# Patient Record
Sex: FEMALE | Race: WHITE | NOT HISPANIC OR LATINO | Employment: FULL TIME | URBAN - METROPOLITAN AREA
[De-identification: names, ages, dates, MRNs, and addresses within clinical notes are randomized per-mention and may not be internally consistent; named-entity substitution may affect disease eponyms.]

---

## 2017-01-01 DIAGNOSIS — R01.1 MURMUR, CARDIAC: ICD-10-CM

## 2017-01-01 DIAGNOSIS — I48.0 PAROXYSMAL ATRIAL FIBRILLATION (HCC): Primary | ICD-10-CM

## 2017-01-01 DIAGNOSIS — I27.20 PULMONARY HYPERTENSION (HCC): ICD-10-CM

## 2017-01-11 RX ORDER — ATORVASTATIN CALCIUM 20 MG/1
TABLET, FILM COATED ORAL
Qty: 30 TABLET | Refills: 1 | Status: SHIPPED | OUTPATIENT
Start: 2017-01-11 | End: 2017-03-09 | Stop reason: SDUPTHER

## 2017-02-03 RX ORDER — FUROSEMIDE 40 MG/1
TABLET ORAL
Qty: 30 TABLET | Refills: 0 | Status: SHIPPED | OUTPATIENT
Start: 2017-02-03 | End: 2017-03-10 | Stop reason: SDUPTHER

## 2017-02-28 RX ORDER — CARVEDILOL 6.25 MG/1
TABLET ORAL
Qty: 60 TABLET | Refills: 0 | Status: SHIPPED | OUTPATIENT
Start: 2017-02-28 | End: 2017-04-19 | Stop reason: SDUPTHER

## 2017-02-28 RX ORDER — RAMIPRIL 2.5 MG/1
CAPSULE ORAL
Qty: 30 CAPSULE | Refills: 0 | Status: SHIPPED | OUTPATIENT
Start: 2017-02-28 | End: 2017-04-18 | Stop reason: SDUPTHER

## 2017-03-01 RX ORDER — FUROSEMIDE 40 MG/1
TABLET ORAL
Qty: 30 TABLET | Refills: 0 | OUTPATIENT
Start: 2017-03-01

## 2017-03-06 ENCOUNTER — APPOINTMENT (OUTPATIENT)
Dept: CARDIOLOGY | Facility: HOSPITAL | Age: 57
End: 2017-03-06
Attending: INTERNAL MEDICINE

## 2017-03-06 ENCOUNTER — OFFICE VISIT (OUTPATIENT)
Dept: CARDIOLOGY | Facility: CLINIC | Age: 57
End: 2017-03-06

## 2017-03-06 VITALS
WEIGHT: 139 LBS | BODY MASS INDEX: 23.16 KG/M2 | SYSTOLIC BLOOD PRESSURE: 106 MMHG | HEIGHT: 65 IN | DIASTOLIC BLOOD PRESSURE: 71 MMHG | HEART RATE: 102 BPM

## 2017-03-06 DIAGNOSIS — I48.91 ATRIAL FIBRILLATION AND FLUTTER (HCC): Primary | ICD-10-CM

## 2017-03-06 DIAGNOSIS — J43.9 PULMONARY EMPHYSEMA, UNSPECIFIED EMPHYSEMA TYPE (HCC): ICD-10-CM

## 2017-03-06 DIAGNOSIS — F41.0 PANIC ATTACK: ICD-10-CM

## 2017-03-06 DIAGNOSIS — I48.3 TYPICAL ATRIAL FLUTTER (HCC): ICD-10-CM

## 2017-03-06 DIAGNOSIS — J30.9 ATOPIC RHINITIS: ICD-10-CM

## 2017-03-06 DIAGNOSIS — I27.20 PULMONARY HYPERTENSION (HCC): ICD-10-CM

## 2017-03-06 DIAGNOSIS — I27.81 CHRONIC COR PULMONALE (HCC): ICD-10-CM

## 2017-03-06 DIAGNOSIS — I48.0 PAROXYSMAL ATRIAL FIBRILLATION (HCC): ICD-10-CM

## 2017-03-06 DIAGNOSIS — I48.92 ATRIAL FIBRILLATION AND FLUTTER (HCC): Primary | ICD-10-CM

## 2017-03-06 DIAGNOSIS — Z98.890 HISTORY OF HEART VALVE REPAIR: ICD-10-CM

## 2017-03-06 PROCEDURE — 93000 ELECTROCARDIOGRAM COMPLETE: CPT | Performed by: INTERNAL MEDICINE

## 2017-03-06 PROCEDURE — 99214 OFFICE O/P EST MOD 30 MIN: CPT | Performed by: INTERNAL MEDICINE

## 2017-03-06 NOTE — PROGRESS NOTES
[facility]  Cardiology Office Visit Note        Subjective:     Encounter Date:2017      Patient ID: Griselda Dunn   Age: 56 y.o.  Sex: female  :  1960  MRN: [id number]             Date of Office Visit: 2017  Encounter Provider: Lisa Trejo MD  Place of Service: [facility]     .    Chief Complaint:  History of Present Illness    The following portions of the patient's history were reviewed and updated as appropriate: allergies, current medications, past family history, past medical history, past social history, past surgical history and problem list.    Review of Systems   Constitution: Negative for chills, fever and weight gain.   HENT: Negative for congestion, headaches, hearing loss and sore throat.    Eyes: Positive for double vision. Negative for blurred vision.   Cardiovascular: Negative for chest pain, claudication, cyanosis, dyspnea on exertion, irregular heartbeat, leg swelling, near-syncope, orthopnea, palpitations, paroxysmal nocturnal dyspnea and syncope.   Respiratory: Negative for cough, shortness of breath, snoring and wheezing.    Endocrine: Negative for cold intolerance.   Hematologic/Lymphatic: Negative for adenopathy. Does not bruise/bleed easily.   Skin: Negative for rash.   Musculoskeletal: Negative for back pain.   Gastrointestinal: Negative for abdominal pain, change in bowel habit, constipation, diarrhea, nausea and vomiting.   Genitourinary: Negative for dysuria, frequency, hematuria and hesitancy.   Neurological: Negative for disturbances in coordination, excessive daytime sleepiness, dizziness, focal weakness, light-headedness, loss of balance and numbness.   Psychiatric/Behavioral: Negative for depression and memory loss. The patient is not nervous/anxious.    Allergic/Immunologic: Negative for hives.         ECG 12 Lead  Date/Time: 3/6/2017 2:24 PM  Performed by: LISA TREJO JR  Authorized by: MAYA  LISA MUNIZ   Comparison: compared with previous ECG   Rhythm: sinus rhythm  BPM: 95  Conduction: right bundle branch block and LPFB                       HPI     The patient is a 56-year-old white female with history of paroxysmal atrial fibrillation status post atrial flutter ablation by Dr. Jennings 2013, history of cardiac catheterization in 2011 showing normal coronary arteries, history of mitral and tricuspid valve repair, presents for cardiac follow-up.  I last saw her in the office in 2015.  Since then, she broke her sacrum in 2  places in 2016 and has been sitting on a doughnut until a week ago.  She has not been exercising.    Cardiac review systems: No chest pain.  No PND, orthopnea or pedal edema.  No palpitations dizziness or syncope.    Gen. review systems: She's had chest congestion for the past week.    Cardiac risk factors: No family history of early CAD.  No smoking since 2011.  No diabetes.  Positive for hyperlipidemia and hypertension.            Past Medical History   Diagnosis Date   • Acute bronchitis    • Acute cholecystitis      post cholecystectomy   • Acute sinusitis    • Cardiomyopathy      previously reduced ejection fraction, now normal.   • Fatigue    • Mitral and aortic valve disease    • Paroxysmal atrial fibrillation    • Pink eye    • Pulmonary hypertension    • Dinah Escobar auricular syndrome    • Right bundle branch block    • Shortness of breath    • Strain of thoracic region        Past Surgical History   Procedure Laterality Date   • Cardioversion     • Breast surgery       enlargement   •  section     • Cholecystectomy     • Knee surgery     • Mitral valve repair     • Tricuspid valve surgery       REPAIR   • Augmentation mammaplasty         Social History     Social History   • Marital status:      Spouse name: N/A   • Number of children: N/A   • Years of education: N/A     Occupational History   • Not on file.      Social History Main Topics   • Smoking status: Former Smoker     Quit date: 1/1/2010   • Smokeless tobacco: Not on file   • Alcohol use Not on file   • Drug use: Not on file   • Sexual activity: Not on file     Other Topics Concern   • Not on file     Social History Narrative       Family History   Problem Relation Age of Onset   • Breast cancer Mother    • Hypertension Mother    • Hypertension Father    • Breast cancer Maternal Grandmother    • Breast cancer Maternal Aunt            Scheduled Meds:  Current Outpatient Prescriptions on File Prior to Visit   Medication Sig Dispense Refill   • ADVAIR DISKUS 250-50 MCG/DOSE DISKUS INHALE 1 PUFF BY MOUTH TWICE DAILY 180 each 2   • albuterol (PROVENTIL) (2.5 MG/3ML) 0.083% nebulizer solution Albuterol Sulfate (2.5 MG/3ML) 0.083% Inhalation Nebulization Solution; Patient Sig: Albuterol Sulfate (2.5 MG/3ML) 0.083% Inhalation Nebulization Solution USE 1 UNIT DOSE EVERY 4-6 HOURS AS NEEDED FOR WHEEZING .; 1; 0; 24-Jul-2014; Active     • aspirin 81 MG EC tablet Take 81 mg by mouth Daily.     • atorvastatin (LIPITOR) 20 MG tablet TAKE 1 TABLET DAILY. 30 tablet 1   • benzonatate (TESSALON) 200 MG capsule Take 200 mg by mouth 3 (three) times a day as needed for cough.     • Biotin 5000 MCG capsule Take 1 capsule by mouth daily.     • carisoprodol (SOMA) 350 MG tablet Take 350 mg by mouth 4 (four) times a day as needed for muscle spasms.     • carvedilol (COREG) 6.25 MG tablet TAKE 1 TABLET BY MOUTH TWICE DAILY 60 tablet 0   • cefuroxime (CEFTIN) 500 MG tablet Take 500 mg by mouth 2 (two) times a day.     • clonazePAM (KlonoPIN) 1 MG tablet TAKE 1 TABLET BY MOUTH TWICE A DAY AS NEEDED FOR ANXIETY 60 tablet 2   • cyclobenzaprine (FLEXERIL) 10 MG tablet TAKE 1 TABLET BY MOUTH 3 (THREE) TIMES A DAY AS NEEDED FOR MUSCLE SPASMS. 30 tablet 3   • DICLOFENAC PO Take 75 mg by mouth 2 (Two) Times a Day.     • digoxin (LANOXIN) 250 MCG tablet TAKE 1 TABLET BY MOUTH EVERY DAY AT NOON  1  "  • furosemide (LASIX) 40 MG tablet TAKE 1 TABLET BY MOUTH EVERYDAY 30 tablet 0   • gabapentin (NEURONTIN) 100 MG capsule Take 100 mg by mouth 3 (Three) Times a Day.     • GuaiFENesin ER 1200 MG tablet sustained-release 12 hour Take 1 tablet by mouth every 12 (twelve) hours as needed.     • HYDROcodone-acetaminophen (NORCO) 7.5-325 MG per tablet Take 1 tablet by mouth Every 6 (Six) Hours As Needed for moderate pain (4-6).     • HYDROcodone-acetaminophen (NORCO) 7.5-325 MG per tablet Take 1 tablet by mouth Every 4 (Four) Hours As Needed for moderate pain (4-6). 30 tablet 0   • MethylPREDNISolone (MEDROL) 4 MG tablet follow package directions 21 tablet 0   • Multiple Vitamins-Minerals (MULTI COMPLETE) capsule Take 1 capsule by mouth daily.     • Omega-3 Fatty Acids (FISH OIL) 1000 MG capsule capsule Take 1,000 mg by mouth Every Night.     • oxyCODONE-acetaminophen (PERCOCET)  MG per tablet Take 1 tablet by mouth every 6 (six) hours as needed for moderate pain (4-6). 15 tablet 0   • ramipril (ALTACE) 2.5 MG capsule TAKE ONE CAPSULE BY MOUTH EVERY DAY 30 capsule 0   • tiotropium (SPIRIVA HANDIHALER) 18 MCG per inhalation capsule Place 2 puffs into inhaler and inhale 1 (one) time daily. 90 capsule 3   • tiZANidine (ZANAFLEX) 4 MG tablet Take 4 mg by mouth Every 8 (Eight) Hours As Needed for muscle spasms.     • venlafaxine XR (EFFEXOR-XR) 150 MG 24 hr capsule TAKE 1 CAPSULE DAILY. 90 capsule 1   • vitamin B-12 (CYANOCOBALAMIN) 1000 MCG tablet Take 1,000 mcg by mouth Daily.     • amoxicillin-clavulanate (AUGMENTIN) 875-125 MG per tablet Take 1 tablet by mouth 2 (two) times a day for 10 days. 20 tablet 0     No current facility-administered medications on file prior to visit.        Visit Vitals   • /71   • Pulse 102   • Ht 65\" (165.1 cm)   • Wt 139 lb (63 kg)   • BMI 23.13 kg/m2       Objective:     Physical Exam   Constitutional: She is oriented to person, place, and time. She appears well-developed and " well-nourished. No distress.   HENT:   Head: Normocephalic and atraumatic.   Right Ear: External ear normal.   Left Ear: External ear normal.   Mouth/Throat: Oropharynx is clear and moist. No oropharyngeal exudate.   Eyes: Conjunctivae and EOM are normal. Pupils are equal, round, and reactive to light. No scleral icterus.   Neck: Normal range of motion. Neck supple. No JVD present. No tracheal deviation present. No thyromegaly present.   Cardiovascular: Normal rate, regular rhythm, S1 normal, S2 normal and intact distal pulses.  PMI is not displaced.  Exam reveals no gallop, no distant heart sounds, no friction rub and no decreased pulses.    Murmur heard.  1/6 systolic murmur at the left sternal border   Pulmonary/Chest: Effort normal. No accessory muscle usage. No respiratory distress. She has wheezes. She has no rales. She exhibits no tenderness.   Abdominal: Soft. Bowel sounds are normal. She exhibits no distension and no mass. There is no tenderness. There is no rebound and no guarding.   Musculoskeletal: Normal range of motion. She exhibits no edema, tenderness or deformity.   Lymphadenopathy:     She has no cervical adenopathy.   Neurological: She is alert and oriented to person, place, and time. She has normal reflexes. No cranial nerve deficit. Coordination normal.   Skin: Skin is dry. No rash noted. She is not diaphoretic. No erythema. No pallor.   Psychiatric: She has a normal mood and affect.             Lab Review:               Lab Review:         Lab Review     No results found for: CHOL  Lab Results   Component Value Date    HDL 94 (H) 12/30/2014     Lab Results   Component Value Date     (H) 12/30/2014     Lab Results   Component Value Date    TRIG 125 12/30/2014     No components found for: CHOLHDL  Lab Results   Component Value Date    BUN 10 12/30/2014    CREATININE 0.69 12/30/2014    EGFRIFNONA >60 12/30/2014    EGFRIFAFRI >60 12/30/2014    BCR 14 12/30/2014    CO2 31 (H) 12/30/2014     CALCIUM 9.6 12/30/2014    PROTENTOTREF 7.1 12/30/2014    ALBUMIN 4.7 12/30/2014    LABIL2 2 12/30/2014    AST 33 (H) 12/30/2014    ALT 52 (H) 12/30/2014     Lab Results   Component Value Date    CALCIUM 9.6 12/30/2014     12/30/2014    K 4.5 12/30/2014    CO2 31 (H) 12/30/2014    CL 98 12/30/2014    BUN 10 12/30/2014    CREATININE 0.69 12/30/2014    EGFRIFAFRI >60 12/30/2014    EGFRIFNONA >60 12/30/2014    BCR 14 12/30/2014     Lab Results   Component Value Date    WBC 6.71 12/30/2014    HGB 14.8 12/30/2014    HCT 44.9 12/30/2014    MCV 99.8 (H) 12/30/2014     12/30/2014     No results found for: DDIMER  Lab Results   Component Value Date    TSH 1.760 12/30/2014     No results found for: CKTOTAL  Lab Results   Component Value Date    DIGOXIN 1.5 12/30/2014     No results found for: CKTOTAL, CKMB, CKMBINDEX, TROPONINI, TROPONINT  No results found for: INR, PROTIME  CrCl cannot be calculated (Patient has no serum creatinine result on file.).    Assessment:          Diagnosis Plan   1. Atrial fibrillation and flutter  ECG 12 Lead   2. Paroxysmal atrial fibrillation     3. Typical atrial flutter     4. Chronic cor pulmonale     5. Pulmonary hypertension     6. Atopic rhinitis     7. Pulmonary emphysema, unspecified emphysema type     8. History of heart valve repair     9. Panic attack            Assessment and Plan:    Griselda was seen today for atrial fibrillation.    Diagnoses and all orders for this visit:    Atrial fibrillation and flutter  -     ECG 12 Lead    Paroxysmal atrial fibrillation    Typical atrial flutter    Chronic cor pulmonale    Pulmonary hypertension    Atopic rhinitis    Pulmonary emphysema, unspecified emphysema type    History of heart valve repair    Panic attack    The patient is wheezing on exam probably from a upper respiratory infection.  I told her to follow-up with her primary care physician.  She states that she has not smoked since 2011.    She cannot exercise because of the  broken sacrum.  She is getting better though.  Her blood pressure well controlled today.     I will schedule for echocardiogram to evaluate history of mitral and tricuspid valve repair.    Her primary care physician follows her Lipitor therapy.    A total of 25 minutes was spent in the care of this patient, including at least 13 minutes face-to-face with the patient.    I not only counseled the patient today on the significant factors noted in the assessment and plan, but I also recommended that the patient reduce salt and saturated animal fat intake in diet, as well as to perform scheduled exercise on a regular basis.      Plan:                  03/06/2017  3:57 PM  MD Praful Villar MD  3/6/2017, 3:57 PM    EMR Dragon/Transcription disclaimer:   Much of this encounter note is an electronic transcription/translation of spoken language to printed text. The electronic translation of spoken language may permit erroneous, or at times, nonsensical words or phrases to be inadvertently transcribed; Although I have reviewed the note for such errors, some may still exist.

## 2017-03-13 RX ORDER — FUROSEMIDE 40 MG/1
TABLET ORAL
Qty: 30 TABLET | Refills: 2 | Status: SHIPPED | OUTPATIENT
Start: 2017-03-13 | End: 2017-06-13 | Stop reason: SDUPTHER

## 2017-03-13 RX ORDER — ATORVASTATIN CALCIUM 20 MG/1
TABLET, FILM COATED ORAL
Qty: 90 TABLET | Refills: 1 | Status: SHIPPED | OUTPATIENT
Start: 2017-03-13 | End: 2017-03-15 | Stop reason: SDUPTHER

## 2017-03-14 ENCOUNTER — HOSPITAL ENCOUNTER (OUTPATIENT)
Dept: CARDIOLOGY | Facility: HOSPITAL | Age: 57
Discharge: HOME | End: 2017-03-14
Attending: INTERNAL MEDICINE | Admitting: INTERNAL MEDICINE

## 2017-03-14 VITALS
DIASTOLIC BLOOD PRESSURE: 71 MMHG | HEIGHT: 65 IN | BODY MASS INDEX: 23.16 KG/M2 | SYSTOLIC BLOOD PRESSURE: 106 MMHG | WEIGHT: 139 LBS

## 2017-03-14 DIAGNOSIS — I27.20 PULMONARY HYPERTENSION (HCC): ICD-10-CM

## 2017-03-14 DIAGNOSIS — R01.1 MURMUR, CARDIAC: ICD-10-CM

## 2017-03-14 DIAGNOSIS — I48.0 PAROXYSMAL ATRIAL FIBRILLATION (HCC): ICD-10-CM

## 2017-03-14 PROCEDURE — 93306 TTE W/DOPPLER COMPLETE: CPT | Performed by: INTERNAL MEDICINE

## 2017-03-14 PROCEDURE — 93306 TTE W/DOPPLER COMPLETE: CPT

## 2017-03-14 PROCEDURE — 0399T HC MYOCARDL STRAIN IMAG QUAN ASSMT PER SESS: CPT

## 2017-03-15 ENCOUNTER — TELEPHONE (OUTPATIENT)
Dept: CARDIOLOGY | Facility: HOSPITAL | Age: 57
End: 2017-03-15

## 2017-03-15 DIAGNOSIS — I05.0 MITRAL VALVE STENOSIS, UNSPECIFIED ETIOLOGY: ICD-10-CM

## 2017-03-15 DIAGNOSIS — I35.0 AORTIC VALVE STENOSIS, UNSPECIFIED ETIOLOGY: ICD-10-CM

## 2017-03-15 DIAGNOSIS — I27.20 PULMONARY HTN (HCC): ICD-10-CM

## 2017-03-15 DIAGNOSIS — I27.81 COR PULMONALE (HCC): Primary | ICD-10-CM

## 2017-03-15 LAB
BH CV ECHO MEAS - ACS: 1.7 CM
BH CV ECHO MEAS - AO MAX PG (FULL): 4.4 MMHG
BH CV ECHO MEAS - AO MAX PG: 9.1 MMHG
BH CV ECHO MEAS - AO MEAN PG (FULL): 2 MMHG
BH CV ECHO MEAS - AO MEAN PG: 5 MMHG
BH CV ECHO MEAS - AO ROOT AREA (BSA CORRECTED): 1.7
BH CV ECHO MEAS - AO ROOT AREA: 6.2 CM^2
BH CV ECHO MEAS - AO ROOT DIAM: 2.8 CM
BH CV ECHO MEAS - AO V2 MAX: 151 CM/SEC
BH CV ECHO MEAS - AO V2 MEAN: 101 CM/SEC
BH CV ECHO MEAS - AO V2 VTI: 30.7 CM
BH CV ECHO MEAS - AVA(I,A): 2.9 CM^2
BH CV ECHO MEAS - AVA(I,D): 2.9 CM^2
BH CV ECHO MEAS - AVA(V,A): 2.5 CM^2
BH CV ECHO MEAS - AVA(V,D): 2.5 CM^2
BH CV ECHO MEAS - BSA(HAYCOCK): 1.7 M^2
BH CV ECHO MEAS - BSA: 1.7 M^2
BH CV ECHO MEAS - BZI_BMI: 23.1 KILOGRAMS/M^2
BH CV ECHO MEAS - BZI_METRIC_HEIGHT: 165.1 CM
BH CV ECHO MEAS - BZI_METRIC_WEIGHT: 63.1 KG
BH CV ECHO MEAS - CONTRAST EF 4CH: 60.9 ML/M^2
BH CV ECHO MEAS - EDV(CUBED): 79.5 ML
BH CV ECHO MEAS - EDV(MOD-SP4): 87 ML
BH CV ECHO MEAS - EDV(TEICH): 83.1 ML
BH CV ECHO MEAS - EF(CUBED): 58.8 %
BH CV ECHO MEAS - EF(MOD-SP4): 60.9 %
BH CV ECHO MEAS - EF(TEICH): 50.7 %
BH CV ECHO MEAS - ESV(CUBED): 32.8 ML
BH CV ECHO MEAS - ESV(MOD-SP4): 34 ML
BH CV ECHO MEAS - ESV(TEICH): 41 ML
BH CV ECHO MEAS - FS: 25.6 %
BH CV ECHO MEAS - IVS/LVPW: 1
BH CV ECHO MEAS - IVSD: 1 CM
BH CV ECHO MEAS - LA DIMENSION: 4.5 CM
BH CV ECHO MEAS - LA/AO: 1.6
BH CV ECHO MEAS - LAT PEAK E' VEL: 6.4 CM/SEC
BH CV ECHO MEAS - LV DIASTOLIC VOL/BSA (35-75): 51.3 ML/M^2
BH CV ECHO MEAS - LV MASS(C)D: 142.5 GRAMS
BH CV ECHO MEAS - LV MASS(C)DI: 84.1 GRAMS/M^2
BH CV ECHO MEAS - LV MAX PG: 4.8 MMHG
BH CV ECHO MEAS - LV MEAN PG: 3 MMHG
BH CV ECHO MEAS - LV SYSTOLIC VOL/BSA (12-30): 20.1 ML/M^2
BH CV ECHO MEAS - LV V1 MAX: 109 CM/SEC
BH CV ECHO MEAS - LV V1 MEAN: 74.2 CM/SEC
BH CV ECHO MEAS - LV V1 VTI: 25.5 CM
BH CV ECHO MEAS - LVIDD: 4.3 CM
BH CV ECHO MEAS - LVIDS: 3.2 CM
BH CV ECHO MEAS - LVLD AP4: 7.7 CM
BH CV ECHO MEAS - LVLS AP4: 6.6 CM
BH CV ECHO MEAS - LVOT AREA (M): 3.5 CM^2
BH CV ECHO MEAS - LVOT AREA: 3.5 CM^2
BH CV ECHO MEAS - LVOT DIAM: 2.1 CM
BH CV ECHO MEAS - LVPWD: 1 CM
BH CV ECHO MEAS - MED PEAK E' VEL: 5.8 CM/SEC
BH CV ECHO MEAS - MR MAX PG: 67.2 MMHG
BH CV ECHO MEAS - MR MAX VEL: 410 CM/SEC
BH CV ECHO MEAS - MV A DUR: 0.15 SEC
BH CV ECHO MEAS - MV A MAX VEL: 76.2 CM/SEC
BH CV ECHO MEAS - MV DEC SLOPE: 462 CM/SEC^2
BH CV ECHO MEAS - MV DEC TIME: 0.31 SEC
BH CV ECHO MEAS - MV E MAX VEL: 164 CM/SEC
BH CV ECHO MEAS - MV E/A: 2.2
BH CV ECHO MEAS - MV MAX PG: 12.5 MMHG
BH CV ECHO MEAS - MV MEAN PG: 5 MMHG
BH CV ECHO MEAS - MV P1/2T MAX VEL: 189.5 CM/SEC
BH CV ECHO MEAS - MV P1/2T: 120.1 MSEC
BH CV ECHO MEAS - MV V2 MAX: 177 CM/SEC
BH CV ECHO MEAS - MV V2 MEAN: 97.6 CM/SEC
BH CV ECHO MEAS - MV V2 VTI: 51.7 CM
BH CV ECHO MEAS - MVA P1/2T LCG: 1.2 CM^2
BH CV ECHO MEAS - MVA(P1/2T): 1.8 CM^2
BH CV ECHO MEAS - MVA(VTI): 1.7 CM^2
BH CV ECHO MEAS - PA MAX PG (FULL): 2.2 MMHG
BH CV ECHO MEAS - PA MAX PG: 3 MMHG
BH CV ECHO MEAS - PA V2 MAX: 86.9 CM/SEC
BH CV ECHO MEAS - PULM A REVS DUR: 0.13 SEC
BH CV ECHO MEAS - PULM A REVS VEL: 22.8 CM/SEC
BH CV ECHO MEAS - PULM DIAS VEL: 58.7 CM/SEC
BH CV ECHO MEAS - PULM S/D: 1
BH CV ECHO MEAS - PULM SYS VEL: 58.7 CM/SEC
BH CV ECHO MEAS - PVA(V,A): 2.5 CM^2
BH CV ECHO MEAS - PVA(V,D): 2.5 CM^2
BH CV ECHO MEAS - QP/QS: 0.62
BH CV ECHO MEAS - RAP SYSTOLE: 10 MMHG
BH CV ECHO MEAS - RV MAX PG: 0.81 MMHG
BH CV ECHO MEAS - RV MEAN PG: 0 MMHG
BH CV ECHO MEAS - RV V1 MAX: 45.1 CM/SEC
BH CV ECHO MEAS - RV V1 MEAN: 29.8 CM/SEC
BH CV ECHO MEAS - RV V1 VTI: 11.2 CM
BH CV ECHO MEAS - RVDD: 2 CM
BH CV ECHO MEAS - RVOT AREA: 4.9 CM^2
BH CV ECHO MEAS - RVOT DIAM: 2.5 CM
BH CV ECHO MEAS - RVSP: 52.5 MMHG
BH CV ECHO MEAS - SI(AO): 111.5 ML/M^2
BH CV ECHO MEAS - SI(CUBED): 27.6 ML/M^2
BH CV ECHO MEAS - SI(LVOT): 52.1 ML/M^2
BH CV ECHO MEAS - SI(MOD-SP4): 31.3 ML/M^2
BH CV ECHO MEAS - SI(TEICH): 24.8 ML/M^2
BH CV ECHO MEAS - SV(AO): 189 ML
BH CV ECHO MEAS - SV(CUBED): 46.7 ML
BH CV ECHO MEAS - SV(LVOT): 88.3 ML
BH CV ECHO MEAS - SV(MOD-SP4): 53 ML
BH CV ECHO MEAS - SV(RVOT): 55 ML
BH CV ECHO MEAS - SV(TEICH): 42.1 ML
BH CV ECHO MEAS - TAPSE (>1.6): 1.3 CM2
BH CV ECHO MEAS - TR MAX VEL: 326 CM/SEC
BH CV XLRA - RV BASE: 3.2 CM
BH CV XLRA - RV LENGTH: 6.3 CM
BH CV XLRA - RV MID: 1.9 CM
BH CV XLRA - TDI S': 7 CM/SEC
LEFT ATRIUM VOLUME INDEX: 34.9 ML/M2
LV EF 2D ECHO EST: 60 %

## 2017-03-15 RX ORDER — ATORVASTATIN CALCIUM 20 MG/1
20 TABLET, FILM COATED ORAL DAILY
Qty: 90 TABLET | Refills: 0 | Status: SHIPPED | OUTPATIENT
Start: 2017-03-15 | End: 2020-09-01

## 2017-03-15 NOTE — TELEPHONE ENCOUNTER
Echo:  NL EF.    Moderate cor pulmonale.  Moderate pulm HTN.  Mild AS.  Above unchanged     mild to moderate MS, sl worse.  S/p MV repair.    F/u echo in 6 months with OV.            Study date: 3/14/17         Patient Information      Patient Name MRN Sex  (Age)     Griselda Dunn [id number] Female 1960 (56 y.o.)       Interpretation Summary      · Left ventricular function is normal. Estimated EF = 60%.  · Right ventricular cavity is moderately dilated.  · Moderately reduced right ventricular systolic function noted.  · Left atrial cavity size is mild-to-moderately dilated.  · Moderate tricuspid valve regurgitation is present.  · Mild aortic valve regurgitation is present.  · Mild aortic valve stenosis is present.  · Mild-to-moderate mitral valve regurgitation is present  · Mild to moderate mitral valve stenosis is present         Echo

## 2017-03-24 NOTE — TELEPHONE ENCOUNTER
===View-only below this line===    ----- Message -----     From: Jesusita Agee     Sent: 3/24/2017  11:11 AM       To: Claribel Corrigan  Subject: ECHO RESULTS

## 2017-04-10 ENCOUNTER — TRANSCRIBE ORDERS (OUTPATIENT)
Dept: PHYSICAL THERAPY | Facility: CLINIC | Age: 57
End: 2017-04-10

## 2017-04-10 DIAGNOSIS — S34.139A: Primary | ICD-10-CM

## 2017-04-10 DIAGNOSIS — S32.10XA: Primary | ICD-10-CM

## 2017-04-13 ENCOUNTER — TREATMENT (OUTPATIENT)
Dept: PHYSICAL THERAPY | Facility: CLINIC | Age: 57
End: 2017-04-13

## 2017-04-13 DIAGNOSIS — S32.10XD CLOSED FRACTURE OF SACRUM WITH ROUTINE HEALING, UNSPECIFIED PORTION OF SACRUM, SUBSEQUENT ENCOUNTER: Primary | ICD-10-CM

## 2017-04-13 PROCEDURE — 97001 PR PHYS THERAPY EVALUATION: CPT | Performed by: PHYSICAL THERAPIST

## 2017-04-13 PROCEDURE — 97110 THERAPEUTIC EXERCISES: CPT | Performed by: PHYSICAL THERAPIST

## 2017-04-13 NOTE — PROGRESS NOTES
Physical Therapy Initial Evaluation and Plan of Care    TIME IN 13:35 TIME OUT 14:43  Patient: Griselda Dunn   : 1960  Diagnosis/ICD-10 Code:  Closed fracture of sacrum with routine healing, unspecified portion of sacrum, subsequent encounter [S32.10XD]  Referring practitioner: Nicole Owens MD    Subjective Evaluation    History of Present Illness  Onset date: 2016.  Mechanism of injury: Pt states she initially hurt her sacrum after lifting a heavy pot at work and twisting her back to the left. She states it felt like a pull or stretch on her right buttocks, and the pain was not that intense--she thought she pulled a muscle in her buttocks. States she went to see her doctor who told her it was arthritis and had no MRI. States the pain started to move from her right side to her left and down her left leg to her foot around 2016, and these symptoms remained until December. However, on  she states the pain became worse, and she was unable to bear weight through her left LE-Urgent Care-MRI- saw Dr. Owens-sacrum Fx. She states she was using a rolling walker up to about 1 month ago and switched to a single point cane for pain relief during ambulation. She currently takes pain medication and uses a heating pad at night for pain relief, but states she feels like she needs the heating pad less at night. States she had a trigger point injection in December for her sciatica, and denies radicular symptoms since injection. Pt returned to work yesterday but has to sit the whole time secondary to pain. States she was able to go up and down steps using a handrail about 3 weeks ago.    Pain  Current pain ratin  At best pain ratin (w/ pain medication and heat )  At worst pain ratin  Location: left sacrum  Quality: dull ache and discomfort  Relieving factors: heat and relaxation  Aggravating factors: lifting (stairs, walking, standing long periods)    Diagnostic Tests  MRI  studies: abnormal (sacrum Fx)    Treatments  Previous treatment: injection treatment  Patient Goals  Patient goals for therapy: decreased pain             Objective     Special Questions      Additional Special Questions  No gross swelling or bruising observed on sacrum.    Palpation   Left   No palpable tenderness to the lumbar paraspinals.     Right   No palpable tenderness to the lumbar paraspinals.     Tenderness     Lumbar Spine  No tenderness in the spinous process, facet joint, left transverse process and right transverse process.     Left Hip   No tenderness in the ASIS, PSIS and sacroiliac joint.     Right Hip   No tenderness in the ASIS, PSIS and sacroiliac joint.     Neurological Testing   Sensation     Lumbar   Left   Intact: light touch    Right   Intact: light touch    Reflexes   Left   Patellar (L4): normal (2+)  Achilles (S1): absent (0)    Right   Patellar (L4): normal (2+)  Achilles (S1): normal (2+)    Active Range of Motion     Lumbar   Flexion: WFL  Extension: Active lumbar extension: 50%   Left lateral flexion: WFL  Right lateral flexion: WFL  Left rotation: Active left lumbar rotation: 75%   Right rotation: Active right lumbar rotation: 75%     Strength/Myotome Testing     Left Hip   Planes of Motion   Flexion: 4  Extension: 4  Abduction: 5  Adduction: 4  External rotation: 4  Internal rotation: 4    Right Hip   Planes of Motion   Flexion: 4  Extension: 4  Abduction: 5  Adduction: 4  External rotation: 4  Internal rotation: 4    Left Knee   Flexion: 5  Extension: 5    Right Knee   Flexion: 5  Extension: 5    Left Ankle/Foot   Dorsiflexion: 5  Plantar flexion: 5  Inversion: 4  Eversion: 5  Great toe extension: 5    Right Ankle/Foot   Dorsiflexion: 5  Plantar flexion: 5  Inversion: 5  Eversion: 5  Great toe extension: 5    Additional Strength Details  Abdominal strength: 2/5    Tests     Lumbar     Left   Negative passive SLR.     Right   Negative passive SLR.     Left Hip   Positive long sit.    Negative ORALIA.   Bolivar: Positive.   90/90 SLR: Positive.   SLR: Negative.     Right Hip   Negative ORALIACUAUHTEMOC Lutz: Positive.   90/90 SLR: Positive.   SLR: Negative.     Additional Tests Details  Minimal decreased flexibility of B hamstring.  Decreased flexibility of B piriformis.  Long sit test positive for left anterior inominate.     Ambulation     Observational Gait   Walking speed within functional limits. Decreased stride length.     Additional Observational Gait Details  Pt ambulates using a single point cane on her right side secondary to pain. Demonstrates decreased B stride length and minimal lateral trunk lean towards stance leg.         Assessment & Plan     Assessment  Impairments: abnormal gait, abnormal or restricted ROM, impaired physical strength and pain with function  Assessment details: Pt is a 57 yo WF who exhibits an abnormal gait pattern, left sacrum pain with movement, decreased lumbar ROM, decreased flexibility of B piriformis, hamstrings, and rectus femoris, and decreased core and hip strength.  Prognosis: good  Prognosis details:     STGs: 3 weeks  1. Decrease left sacrum pain to 4/10 with daily activities and walking.  2. Increase abdominal strength to 2+-3/5.  3. Increase flexibility of B hamstring, piriformis, and rectus femoris to WNL.     LTGs: 6 weeks  1. [place] with HEP.  2. Decrease left sacrum pain to <2/10 with daily activities and walking.   3. Ambulates without a single point cane, with decreased trunk lateral lean, and with increased B stride length.  4. Increase B hip strength to 5/5.  5. Increase lumbar extension AROM to 60-70%.  6. Increase lumbar B rotation AROM to 80-85%.    Plan  Therapy options: will be seen for skilled physical therapy services  Planned modality interventions: thermotherapy (hydrocollator packs)  Planned therapy interventions: abdominal trunk stabilization, strengthening, stretching, flexibility, home exercise program and manual  therapy  Frequency: 3x week  Duration in weeks: 6  Treatment plan discussed with: patient        Manual Therapy:         mins  25594;  Therapeutic Exercise:    20     mins  20676;     Neuromuscular Silvia:        mins  99945;    Therapeutic Activity:          mins  31501;     Gait Training:           mins  58262;     Ultrasound:          mins  76280;    Electrical Stimulation:         mins  71094 ( );  Dry Needling          mins self-pay    Timed Treatment:   20   mins   Total Treatment:     68   mins    PT STUDENT SIGNATURE Leticia Moreno, Student PT  PT SIGNATURE: Ayesha Mansfield, PT   DATE TREATMENT INITIATED: 4/13/2017    Initial Certification  Certification Period: 7/12/2017  I certify that the therapy services are furnished while this patient is under my care.  The services outlined above are required by this patient, and will be reviewed every 90 days.     PHYSICIAN: Nicole Owens MD      DATE:     Please sign and return via fax to [phone].. Thank you, [facility].

## 2017-04-18 ENCOUNTER — TREATMENT (OUTPATIENT)
Dept: PHYSICAL THERAPY | Facility: CLINIC | Age: 57
End: 2017-04-18

## 2017-04-18 DIAGNOSIS — S32.10XD CLOSED FRACTURE OF SACRUM WITH ROUTINE HEALING, UNSPECIFIED PORTION OF SACRUM, SUBSEQUENT ENCOUNTER: Primary | ICD-10-CM

## 2017-04-18 PROCEDURE — 97110 THERAPEUTIC EXERCISES: CPT | Performed by: PHYSICAL THERAPIST

## 2017-04-18 NOTE — PROGRESS NOTES
Physical Therapy Daily Progress Note    Time In 15:30  Time Out 16:23    Griselda Dunn reports:  I feel okay today. I still have low back pain when I don't take my pain medication.    Subjective     Objective   Long sit test (+) for left anterior inominate.    See Exercise, Manual, and Modality Logs for complete treatment.       Assessment & Plan     Assessment  Assessment details: Pt tolerated treatment without c/o sacral pain. Correct alignment of pelvis after MET. Tolerated additional LE strengthening exercises and Nustep without pain, but reports minimal fatigue. Has some difficulty turning on table when switching exercises secondary to pain, but pt states it is getting easier to do.  Continue to see 2x/week for L-S stabilization, strengthening, and modalities prn.        Progress per Plan of Care           Manual Therapy:         mins  47620;  Therapeutic Exercise:    30     mins  42124;     Neuromuscular Silvia:        mins  64869;    Therapeutic Activity:          mins  81995;     Gait Training:           mins  91098;     Ultrasound:          mins  06275;    Electrical Stimulation:         mins  21401 ( );  Dry Needling          mins self-pay    Timed Treatment:   30   mins   Total Treatment:     53   mins    Ayesha Mansfield, PT  Physical Therapist

## 2017-04-19 ENCOUNTER — TREATMENT (OUTPATIENT)
Dept: PHYSICAL THERAPY | Facility: CLINIC | Age: 57
End: 2017-04-19

## 2017-04-19 DIAGNOSIS — S32.10XD CLOSED FRACTURE OF SACRUM WITH ROUTINE HEALING, UNSPECIFIED PORTION OF SACRUM, SUBSEQUENT ENCOUNTER: Primary | ICD-10-CM

## 2017-04-19 PROCEDURE — 97110 THERAPEUTIC EXERCISES: CPT | Performed by: PHYSICAL THERAPIST

## 2017-04-19 NOTE — PROGRESS NOTES
Physical Therapy Daily Progress Note    Time In 15:38  Time Out 16:28    Visit # : 3  Griselda Dunn reports: I felt great yesterday after PT. I didn't have to take my pain medication later in the day when I got home, and I didn't need to use my cane to walk around the house. Today, I am hurting--my back started to flare up at work to a 6/10.    Subjective     Objective   See Exercise, Manual, and Modality Logs for complete treatment.       Assessment & Plan     Assessment  Assessment details: Pt tolerated treatment without c/o sacral pain. Correct alignment of pelvis after MET.  Continues to have difficulty turning on table when switching exercises secondary to pain, but pt states it is getting easier to do.  Continue to see 2x/week for L-S stabilization, strengthening, and modalities prn.        Progress per Plan of Care           Manual Therapy:         mins  15152;  Therapeutic Exercise:    30     mins  09285;     Neuromuscular Silvia:        mins  18625;    Therapeutic Activity:          mins  56629;     Gait Training:           mins  40948;     Ultrasound:          mins  47046;    Electrical Stimulation:         mins  36240 ( );  Dry Needling          mins self-pay    Timed Treatment:   30   mins   Total Treatment:     50   mins    Leticia Moreno, Student PT    Ayesha Mansfield, PT  Physical Therapist

## 2017-04-21 ENCOUNTER — TREATMENT (OUTPATIENT)
Dept: PHYSICAL THERAPY | Facility: CLINIC | Age: 57
End: 2017-04-21

## 2017-04-21 DIAGNOSIS — S32.10XD CLOSED FRACTURE OF SACRUM WITH ROUTINE HEALING, UNSPECIFIED PORTION OF SACRUM, SUBSEQUENT ENCOUNTER: Primary | ICD-10-CM

## 2017-04-21 PROCEDURE — 97110 THERAPEUTIC EXERCISES: CPT | Performed by: PHYSICAL THERAPIST

## 2017-04-21 RX ORDER — RAMIPRIL 2.5 MG/1
CAPSULE ORAL
Qty: 30 CAPSULE | Refills: 0 | Status: SHIPPED | OUTPATIENT
Start: 2017-04-21 | End: 2017-05-18 | Stop reason: SDUPTHER

## 2017-04-21 RX ORDER — CARVEDILOL 6.25 MG/1
TABLET ORAL
Qty: 60 TABLET | Refills: 0 | Status: SHIPPED | OUTPATIENT
Start: 2017-04-21 | End: 2017-06-13 | Stop reason: SDUPTHER

## 2017-04-21 NOTE — PROGRESS NOTES
Physical Therapy Daily Progress Note    Time In 14:45  Time Out 15:21    Griselda Dunn reports: I have been feeling good with decreased pain since Wednesday. I have been able to get around my house without using my cane, and I can sit up from lying down without pain. I will do the rest of my exercises at home.    Subjective   Pt arrived 15 minutes late for her appointment, so had to shorten treatment time.      Objective   Long sit test (+) for left anterior inominate, but significant decrease in leg length discrepancy as compared to initial visit.      Pt had SOA and increased HR after 3 minutes on the Nustep. Instructed pt to rest and take deep breaths to slow HR down. HR returned to normal after about 1-1.5 minutes. Pt stated she was pushing herself too hard the first few minutes and felt like her heart was racing. States she has a Hx of A-fib and gets SOA with activity at times. Instructed pt to decrease pace on Nustep, which pt was able to tolerate without SOA.    See Exercise, Manual, and Modality Logs for complete treatment.       Assessment & Plan     Assessment  Assessment details: Correct alignment of pelvis after MET.  Pt demonstrates improved L-S stabilization as seen in a significant decrease of leg length discrepancy in long sitting prior to MET. Responding well to treatment with decreased sacral pain with activity. Continue to see 2x/week for L-S stabilization, strengthening, and modalities prn.         Progress per Plan of Care           Manual Therapy:         mins  78992;  Therapeutic Exercise:    15     mins  27455;     Neuromuscular Silvia:        mins  52755;    Therapeutic Activity:          mins  16833;     Gait Training:           mins  48571;     Ultrasound:          mins  58863;    Electrical Stimulation:         mins  93264 ( );  Dry Needling          mins self-pay    Timed Treatment:   15   mins   Total Treatment:     36   mins    Ayesha Mansfield, PT  Physical Therapist

## 2017-04-26 ENCOUNTER — TREATMENT (OUTPATIENT)
Dept: PHYSICAL THERAPY | Facility: CLINIC | Age: 57
End: 2017-04-26

## 2017-04-26 DIAGNOSIS — S32.10XD CLOSED FRACTURE OF SACRUM WITH ROUTINE HEALING, UNSPECIFIED PORTION OF SACRUM, SUBSEQUENT ENCOUNTER: Primary | ICD-10-CM

## 2017-04-26 PROCEDURE — 97110 THERAPEUTIC EXERCISES: CPT | Performed by: PHYSICAL THERAPIST

## 2017-04-26 NOTE — PROGRESS NOTES
Physical Therapy Daily Progress Note    Time In 14:35  Time Out 15:38    Visit # : 5  Griselda Dunn reports: I am sore today, but it is because I forgot to take my pain medicine. It felt good over the weekend. I am able to turn in bed and sit up from laying down without pain.    Subjective     Objective   Long sit test (+) for left anterior inominate, but only a slight decrease in leg length discrepency.     See Exercise, Manual, and Modality Logs for complete treatment.       Assessment & Plan     Assessment  Assessment details: Pt tolerated increased repetitions and weights with exercises without c/o sacral pain, but c/o minimal B LE fatigue. Tolerated additional L-S exercises on SB without pain, but needed unilateral UE support to maintain balance. Pt demonstrates improved L-S stabilization secondary to decreased leg length discrepancy in long sitting prior to MET. Correct alignment of pelvis after MET. Continue to see 2x/week for L-S stabilization, strengthening, stretching, and modalities prn.        Progress per Plan of Care           Manual Therapy:         mins  47365;  Therapeutic Exercise:    35     mins  50383;     Neuromuscular Silvia:        mins  60788;    Therapeutic Activity:          mins  54133;     Gait Training:      3     mins  75919;     Ultrasound:          mins  74868;    Electrical Stimulation:         mins  15776 ( );  Dry Needling          mins self-pay    Timed Treatment:   38   mins   Total Treatment:     63   mins    Leticia Moreno, Student PT    Ayesha Mansfield, PT  Physical Therapist

## 2017-04-28 ENCOUNTER — TREATMENT (OUTPATIENT)
Dept: PHYSICAL THERAPY | Facility: CLINIC | Age: 57
End: 2017-04-28

## 2017-04-28 DIAGNOSIS — S32.10XD CLOSED FRACTURE OF SACRUM WITH ROUTINE HEALING, UNSPECIFIED PORTION OF SACRUM, SUBSEQUENT ENCOUNTER: Primary | ICD-10-CM

## 2017-04-28 PROCEDURE — 97110 THERAPEUTIC EXERCISES: CPT | Performed by: PHYSICAL THERAPIST

## 2017-04-28 NOTE — PROGRESS NOTES
Physical Therapy Daily Progress Note    Time In 14:05  Time Out 15:12    Visit # : 6  Griselda Dunn reports: she is doing much better and I'm able to sit up without pain.      Subjective     Objective   See Exercise, Manual, and Modality Logs for complete treatment.   Left anterior innominate with supine to long sitting test.      Assessment & Plan     Assessment  Assessment details: Pt tolerated exercises without c/o sacral pain, but c/o minimal B LE fatigue. Tolerated increased duration on Nustep and treadmill without c/o sacral pain or fatigue. Pt demonstrates improved L-S stabilization secondary to decreased leg length discrepancy in long sitting prior to MET. Correct alignment of pelvis after MET.  Continue to see 2x/week for L-S stabilization, strengthening, stretching, and modalities prn.        Progress per Plan of Care           Manual Therapy:    35     mins  74713;  Therapeutic Exercise:         mins  69383;     Neuromuscular Silvia:        mins  14095;    Therapeutic Activity:          mins  79282;     Gait Trainin     mins  02438;     Ultrasound:          mins  81533;    Electrical Stimulation:         mins  38466 ( );  Dry Needling          mins self-pay    Timed Treatment:   40   mins   Total Treatment:     67   mins    Ayesha Mansfield, PT  Physical Therapist

## 2017-05-02 ENCOUNTER — TREATMENT (OUTPATIENT)
Dept: PHYSICAL THERAPY | Facility: CLINIC | Age: 57
End: 2017-05-02

## 2017-05-02 DIAGNOSIS — S32.10XD CLOSED FRACTURE OF SACRUM WITH ROUTINE HEALING, UNSPECIFIED PORTION OF SACRUM, SUBSEQUENT ENCOUNTER: Primary | ICD-10-CM

## 2017-05-02 PROCEDURE — 97110 THERAPEUTIC EXERCISES: CPT | Performed by: PHYSICAL THERAPIST

## 2017-05-03 ENCOUNTER — TREATMENT (OUTPATIENT)
Dept: PHYSICAL THERAPY | Facility: CLINIC | Age: 57
End: 2017-05-03

## 2017-05-03 DIAGNOSIS — S32.10XD CLOSED FRACTURE OF SACRUM WITH ROUTINE HEALING, UNSPECIFIED PORTION OF SACRUM, SUBSEQUENT ENCOUNTER: Primary | ICD-10-CM

## 2017-05-03 PROCEDURE — 97110 THERAPEUTIC EXERCISES: CPT | Performed by: PHYSICAL THERAPIST

## 2017-05-05 ENCOUNTER — TREATMENT (OUTPATIENT)
Dept: PHYSICAL THERAPY | Facility: CLINIC | Age: 57
End: 2017-05-05

## 2017-05-05 DIAGNOSIS — S32.10XD CLOSED FRACTURE OF SACRUM WITH ROUTINE HEALING, UNSPECIFIED PORTION OF SACRUM, SUBSEQUENT ENCOUNTER: Primary | ICD-10-CM

## 2017-05-05 PROCEDURE — 97110 THERAPEUTIC EXERCISES: CPT | Performed by: PHYSICAL THERAPIST

## 2017-05-09 ENCOUNTER — TREATMENT (OUTPATIENT)
Dept: PHYSICAL THERAPY | Facility: CLINIC | Age: 57
End: 2017-05-09

## 2017-05-09 DIAGNOSIS — S32.10XD CLOSED FRACTURE OF SACRUM WITH ROUTINE HEALING, UNSPECIFIED PORTION OF SACRUM, SUBSEQUENT ENCOUNTER: Primary | ICD-10-CM

## 2017-05-09 PROCEDURE — 97110 THERAPEUTIC EXERCISES: CPT | Performed by: PHYSICAL THERAPIST

## 2017-05-10 ENCOUNTER — TREATMENT (OUTPATIENT)
Dept: PHYSICAL THERAPY | Facility: CLINIC | Age: 57
End: 2017-05-10

## 2017-05-10 DIAGNOSIS — S32.10XD CLOSED FRACTURE OF SACRUM WITH ROUTINE HEALING, UNSPECIFIED PORTION OF SACRUM, SUBSEQUENT ENCOUNTER: Primary | ICD-10-CM

## 2017-05-10 PROCEDURE — 97110 THERAPEUTIC EXERCISES: CPT | Performed by: PHYSICAL THERAPIST

## 2017-05-16 ENCOUNTER — TREATMENT (OUTPATIENT)
Dept: PHYSICAL THERAPY | Facility: CLINIC | Age: 57
End: 2017-05-16

## 2017-05-16 DIAGNOSIS — S32.10XD CLOSED FRACTURE OF SACRUM WITH ROUTINE HEALING, UNSPECIFIED PORTION OF SACRUM, SUBSEQUENT ENCOUNTER: Primary | ICD-10-CM

## 2017-05-16 PROCEDURE — 97110 THERAPEUTIC EXERCISES: CPT | Performed by: PHYSICAL THERAPIST

## 2017-05-17 ENCOUNTER — TREATMENT (OUTPATIENT)
Dept: PHYSICAL THERAPY | Facility: CLINIC | Age: 57
End: 2017-05-17

## 2017-05-17 DIAGNOSIS — S32.10XD CLOSED FRACTURE OF SACRUM WITH ROUTINE HEALING, UNSPECIFIED PORTION OF SACRUM, SUBSEQUENT ENCOUNTER: Primary | ICD-10-CM

## 2017-05-17 PROCEDURE — 97110 THERAPEUTIC EXERCISES: CPT | Performed by: PHYSICAL THERAPIST

## 2017-05-19 RX ORDER — RAMIPRIL 2.5 MG/1
CAPSULE ORAL
Qty: 30 CAPSULE | Refills: 0 | Status: SHIPPED | OUTPATIENT
Start: 2017-05-19 | End: 2017-05-25 | Stop reason: SDUPTHER

## 2017-05-23 ENCOUNTER — TREATMENT (OUTPATIENT)
Dept: PHYSICAL THERAPY | Facility: CLINIC | Age: 57
End: 2017-05-23

## 2017-05-23 DIAGNOSIS — S32.10XD CLOSED FRACTURE OF SACRUM WITH ROUTINE HEALING, UNSPECIFIED PORTION OF SACRUM, SUBSEQUENT ENCOUNTER: Primary | ICD-10-CM

## 2017-05-23 PROCEDURE — 97110 THERAPEUTIC EXERCISES: CPT | Performed by: PHYSICAL THERAPIST

## 2017-05-24 ENCOUNTER — TREATMENT (OUTPATIENT)
Dept: PHYSICAL THERAPY | Facility: CLINIC | Age: 57
End: 2017-05-24

## 2017-05-24 DIAGNOSIS — S32.10XD CLOSED FRACTURE OF SACRUM WITH ROUTINE HEALING, UNSPECIFIED PORTION OF SACRUM, SUBSEQUENT ENCOUNTER: Primary | ICD-10-CM

## 2017-05-24 PROCEDURE — 97110 THERAPEUTIC EXERCISES: CPT | Performed by: PHYSICAL THERAPIST

## 2017-05-25 RX ORDER — RAMIPRIL 2.5 MG/1
2.5 CAPSULE ORAL DAILY
Qty: 90 CAPSULE | Refills: 3 | Status: SHIPPED | OUTPATIENT
Start: 2017-05-25 | End: 2020-09-01

## 2017-05-26 ENCOUNTER — TREATMENT (OUTPATIENT)
Dept: PHYSICAL THERAPY | Facility: CLINIC | Age: 57
End: 2017-05-26

## 2017-05-26 DIAGNOSIS — S32.10XD CLOSED FRACTURE OF SACRUM WITH ROUTINE HEALING, UNSPECIFIED PORTION OF SACRUM, SUBSEQUENT ENCOUNTER: Primary | ICD-10-CM

## 2017-05-26 PROCEDURE — 97110 THERAPEUTIC EXERCISES: CPT | Performed by: PHYSICAL THERAPIST

## 2017-06-13 RX ORDER — CARVEDILOL 6.25 MG/1
6.25 TABLET ORAL 2 TIMES DAILY WITH MEALS
Qty: 180 TABLET | Refills: 3 | Status: SHIPPED | OUTPATIENT
Start: 2017-06-13 | End: 2020-09-01

## 2017-06-13 RX ORDER — FUROSEMIDE 40 MG/1
TABLET ORAL
Qty: 30 TABLET | Refills: 3 | Status: SHIPPED | OUTPATIENT
Start: 2017-06-13 | End: 2020-09-01

## 2017-09-08 RX ORDER — ATORVASTATIN CALCIUM 20 MG/1
TABLET, FILM COATED ORAL
Qty: 90 TABLET | Refills: 1 | OUTPATIENT
Start: 2017-09-08

## 2017-10-02 RX ORDER — FUROSEMIDE 40 MG/1
TABLET ORAL
Qty: 30 TABLET | Refills: 3 | OUTPATIENT
Start: 2017-10-02

## 2017-10-23 RX ORDER — TIOTROPIUM BROMIDE 18 UG/1
CAPSULE ORAL; RESPIRATORY (INHALATION)
Refills: 3 | OUTPATIENT
Start: 2017-10-23

## 2018-06-29 ENCOUNTER — TRANSCRIBE ORDERS (OUTPATIENT)
Dept: ADMINISTRATIVE | Facility: HOSPITAL | Age: 58
End: 2018-06-29

## 2018-06-29 DIAGNOSIS — Z12.39 BREAST SCREENING: Primary | ICD-10-CM

## 2018-07-06 ENCOUNTER — HOSPITAL ENCOUNTER (OUTPATIENT)
Dept: MAMMOGRAPHY | Facility: HOSPITAL | Age: 58
Discharge: HOME | End: 2018-07-06
Attending: OBSTETRICS & GYNECOLOGY | Admitting: OBSTETRICS & GYNECOLOGY

## 2018-07-06 DIAGNOSIS — Z12.39 BREAST SCREENING: ICD-10-CM

## 2018-07-06 PROCEDURE — 77063 BREAST TOMOSYNTHESIS BI: CPT

## 2018-07-06 PROCEDURE — 77067 SCR MAMMO BI INCL CAD: CPT

## 2020-08-07 ENCOUNTER — TRANSCRIBE ORDERS (OUTPATIENT)
Dept: ADMINISTRATIVE | Facility: HOSPITAL | Age: 60
End: 2020-08-07

## 2020-08-07 DIAGNOSIS — Z12.31 VISIT FOR SCREENING MAMMOGRAM: Primary | ICD-10-CM

## 2020-09-01 ENCOUNTER — OFFICE VISIT (OUTPATIENT)
Dept: OBSTETRICS AND GYNECOLOGY | Facility: CLINIC | Age: 60
End: 2020-09-01

## 2020-09-01 VITALS
WEIGHT: 137 LBS | BODY MASS INDEX: 24.27 KG/M2 | SYSTOLIC BLOOD PRESSURE: 128 MMHG | HEIGHT: 63 IN | DIASTOLIC BLOOD PRESSURE: 86 MMHG

## 2020-09-01 DIAGNOSIS — Z01.419 PAP SMEAR, LOW-RISK: ICD-10-CM

## 2020-09-01 DIAGNOSIS — R31.9 HEMATURIA, UNSPECIFIED TYPE: Primary | ICD-10-CM

## 2020-09-01 DIAGNOSIS — Z01.419 WELL WOMAN EXAM: ICD-10-CM

## 2020-09-01 DIAGNOSIS — Z11.51 SCREENING FOR HPV (HUMAN PAPILLOMAVIRUS): ICD-10-CM

## 2020-09-01 DIAGNOSIS — Z13.9 SCREENING FOR UNSPECIFIED CONDITION: ICD-10-CM

## 2020-09-01 LAB
BILIRUB BLD-MCNC: NEGATIVE MG/DL
CLARITY, POC: CLEAR
COLOR UR: YELLOW
GLUCOSE UR STRIP-MCNC: NEGATIVE MG/DL
KETONES UR QL: NEGATIVE
LEUKOCYTE EST, POC: ABNORMAL
NITRITE UR-MCNC: NEGATIVE MG/ML
PH UR: 6 [PH] (ref 5–8)
PROT UR STRIP-MCNC: NEGATIVE MG/DL
RBC # UR STRIP: ABNORMAL /UL
SP GR UR: 1.01 (ref 1–1.03)
UROBILINOGEN UR QL: NORMAL

## 2020-09-01 PROCEDURE — 99386 PREV VISIT NEW AGE 40-64: CPT | Performed by: OBSTETRICS & GYNECOLOGY

## 2020-09-01 PROCEDURE — 81002 URINALYSIS NONAUTO W/O SCOPE: CPT | Performed by: OBSTETRICS & GYNECOLOGY

## 2020-09-01 RX ORDER — CARVEDILOL 6.25 MG/1
6.25 TABLET ORAL
COMMUNITY
Start: 2020-04-28

## 2020-09-01 RX ORDER — FUROSEMIDE 40 MG/1
TABLET ORAL
COMMUNITY
Start: 2020-08-11

## 2020-09-01 RX ORDER — AMOXICILLIN 500 MG/1
CAPSULE ORAL
COMMUNITY
Start: 2020-08-13

## 2020-09-01 RX ORDER — FOLIC ACID 0.8 MG
TABLET ORAL
COMMUNITY

## 2020-09-01 RX ORDER — MULTIVIT-MIN/FOLIC/VIT K/LYCOP 400-20-370
250 TABLET ORAL 2 TIMES DAILY
COMMUNITY
Start: 2020-06-15

## 2020-09-01 RX ORDER — CLONAZEPAM 1 MG/1
1 TABLET ORAL
COMMUNITY
Start: 2016-10-02

## 2020-09-01 RX ORDER — RAMIPRIL 2.5 MG/1
2.5 CAPSULE ORAL DAILY
COMMUNITY
Start: 2020-04-28

## 2020-09-01 RX ORDER — ALBUTEROL SULFATE 90 UG/1
2 AEROSOL, METERED RESPIRATORY (INHALATION) EVERY 4 HOURS
COMMUNITY
Start: 2020-05-29

## 2020-09-01 RX ORDER — BUDESONIDE AND FORMOTEROL FUMARATE DIHYDRATE 160; 4.5 UG/1; UG/1
AEROSOL RESPIRATORY (INHALATION)
COMMUNITY
Start: 2018-08-10

## 2020-09-01 NOTE — PROGRESS NOTES
GYN Annual Exam     CC- Here for annual exam.     Griselda Dunn is a 59 y.o. female who presents for annual well woman exam. Menopause age 47. No PMPB. Pt reports she gets vasomotor symptoms daily.  Last MMG 2018. Pt is on Eliquis for atrial flutter- she has had 2 cardiac ablations. She has had a colon resection due to gangrene- approx 2016. Pt has COPD.     OB History        4    Para   4    Term   4            AB        Living           SAB        TAB        Ectopic        Molar        Multiple        Live Births                    Current contraception: post menopausal status  History of abnormal Pap smear: no  History of abnormal mammogram: no  Family history of uterine, colon or ovarian cancer: no  Family history of breast cancer: no    Health Maintenance   Topic Date Due   • Annual Gynecologic Pelvic and Breast Exam  1960   • ANNUAL PHYSICAL  1961   • TDAP/TD VACCINES (1 - Tdap) 1969   • ZOSTER VACCINE (1 of 2) 2008   • HEPATITIS C SCREENING  2016   • PAP SMEAR  2016   • COLONOSCOPY  2016   • DXA SCAN  04/10/2017   • MAMMOGRAM  2020   • INFLUENZA VACCINE  2020   • PNEUMOCOCCAL VACCINE (19-64 MEDIUM RISK)  Completed       Past Medical History:   Diagnosis Date   • Acute bronchitis    • Acute cholecystitis     post cholecystectomy   • Acute sinusitis    • Cardiomyopathy (CMS/HCC)     previously reduced ejection fraction, now normal.   • Fatigue    • Mitral and aortic valve disease    • Paroxysmal atrial fibrillation (CMS/HCC)    • Pink eye    • Pulmonary hypertension (CMS/HCC)    • Dinah Hunt auricular syndrome    • Right bundle branch block    • Shortness of breath    • Strain of thoracic region        Past Surgical History:   Procedure Laterality Date   • AUGMENTATION MAMMAPLASTY Bilateral     saline   • BREAST SURGERY      enlargement   • CARDIOVERSION     •  SECTION     • CHOLECYSTECTOMY     • KNEE SURGERY     • MITRAL VALVE  REPAIR/REPLACEMENT     • TRICUSPID VALVE SURGERY      REPAIR         Current Outpatient Medications:   •  albuterol sulfate HFA (ProAir HFA) 108 (90 Base) MCG/ACT inhaler, Inhale 2 puffs Every 4 (Four) Hours., Disp: , Rfl:   •  apixaban (ELIQUIS) 5 MG tablet tablet, Take 5 mg by mouth., Disp: , Rfl:   •  budesonide-formoterol (Symbicort) 160-4.5 MCG/ACT inhaler, INHALE 2 PUFFS TWICE DAILY, Disp: , Rfl:   •  carvedilol (COREG) 6.25 MG tablet, Take 6.25 mg by mouth., Disp: , Rfl:   •  clonazePAM (KlonoPIN) 1 MG tablet, Take 1 mg by mouth., Disp: , Rfl:   •  furosemide (LASIX) 40 MG tablet, TAKE 1 TABLET BY MOUTH EVERY DAY, Disp: , Rfl:   •  ramipril (ALTACE) 2.5 MG capsule, Take 2.5 mg by mouth Daily., Disp: , Rfl:   •  tiotropium bromide monohydrate (Spiriva Respimat) 2.5 MCG/ACT aerosol solution inhaler, INHALE 2 INHALATION INTO THE LUNGS DAILY., Disp: , Rfl:   •  amoxicillin (AMOXIL) 500 MG capsule, TAKE FOUR CAPSULES BY MOUTH ONE HOUR PRIOR TO APPOINTMENT AS DIRECTED, Disp: , Rfl:   •  amoxicillin-clavulanate (AUGMENTIN) 875-125 MG per tablet, Take 1 tablet by mouth 2 (two) times a day for 10 days., Disp: 20 tablet, Rfl: 0  •  [employer] DIGESTIVE PROBIOTIC 250 MG capsule, Take 250 mg by mouth 2 (Two) Times a Day., Disp: , Rfl:   •  Dextromethorphan-guaiFENesin 5-100 MG/5ML liquid, Take  by mouth., Disp: , Rfl:   •  Magnesium 500 MG capsule, Take  by mouth., Disp: , Rfl:   •  Multiple Vitamins-Minerals (MULTI COMPLETE) capsule, Take 1 capsule by mouth daily., Disp: , Rfl:   •  oxyCODONE-acetaminophen (PERCOCET)  MG per tablet, Take 1 tablet by mouth every 6 (six) hours as needed for moderate pain (4-6)., Disp: 15 tablet, Rfl: 0    Allergies   Allergen Reactions   • Temazepam Other (See Comments)     Keeps patient awake all night and her skin crawls.   • Hydrocodone-Acetaminophen Other (See Comments)     Nausea and flu symptoms    • Duloxetine Hcl Other (See Comments)     Nerve pain Rt foot  Nerve pain Rt  "foot    Nerve pain Rt foot  Nerve pain Rt foot       Social History     Tobacco Use   • Smoking status: Former Smoker     Last attempt to quit: 1/1/2010     Years since quitting: 10.6   Substance Use Topics   • Alcohol use: Not on file   • Drug use: Not on file       Family History   Problem Relation Age of Onset   • Breast cancer Mother    • Hypertension Mother    • Hypertension Father    • Breast cancer Maternal Grandmother    • Breast cancer Maternal Aunt        Review of Systems   Constitutional: Negative for appetite change, chills, fatigue, fever and unexpected weight change.   Gastrointestinal: Negative for abdominal distention, abdominal pain, anal bleeding, blood in stool, constipation, diarrhea, nausea and vomiting.   Genitourinary: Negative for dyspareunia, dysuria, menstrual problem, pelvic pain, vaginal bleeding, vaginal discharge and vaginal pain.   Musculoskeletal: Positive for back pain.       /86   Ht 160 cm (63\")   Wt 62.1 kg (137 lb)   BMI 24.27 kg/m²     Physical Exam   Constitutional: She is oriented to person, place, and time. She appears well-developed and well-nourished.   HENT:   Mouth/Throat: Normal dentition. No dental caries.   Cardiovascular: Normal rate, regular rhythm and normal heart sounds.   Pulmonary/Chest: Effort normal and breath sounds normal. No stridor. No respiratory distress. She has no wheezes. Right breast exhibits no inverted nipple, no mass, no nipple discharge, no skin change and no tenderness. Left breast exhibits no inverted nipple, no mass, no nipple discharge, no skin change and no tenderness.   augmentation   Abdominal: Soft. She exhibits no distension and no mass. There is no tenderness.   Genitourinary: There is no rash, tenderness or lesion on the right labia. There is no rash, tenderness or lesion on the left labia. Uterus is not deviated, not enlarged, not fixed and not tender. Cervix exhibits no motion tenderness, no discharge and no friability. " Right adnexum displays no mass, no tenderness and no fullness. Left adnexum displays no mass, no tenderness and no fullness. No tenderness or bleeding in the vagina. No vaginal discharge found.   Musculoskeletal: Normal range of motion. She exhibits no edema or tenderness.   Neurological: She is alert and oriented to person, place, and time. No cranial nerve deficit. Coordination normal.   Skin: Skin is warm. No rash noted. No erythema.   Psychiatric: She has a normal mood and affect. Her behavior is normal. Judgment and thought content normal.   Vitals reviewed.         Assessment/Plan    1) GYN HM: Check pap smear. SBE demonstrated and encouraged. Last MMG 7/2018. MMG ordered. Pt scheduled this week. Last colonoscopy 4 years ago.  2) : not a candidate for HRT.  3) Bone health - Weight bearing exercise, dietary calcium recommendations and vitamin D reviewed. Pt reports she is being followed by Dr Ly for osteoporosis. She has been on Forteo. She is scheduled for another bone density and she may be going on Prolia.  4) Diet and Exercise discussed  5) Smoking Status: quit smoking in 2011  6) Atrial flutter: On Eliquis  7) COPD  8) Social: . Raising her 9yo grandson.  9) Follow up prn and 1 year       Diagnoses and all orders for this visit:    Hematuria, unspecified type  -     Urine Culture - Urine, Urine, Random Void    Screening for unspecified condition  -     POC Urinalysis Dipstick    Pap smear, low-risk  -     Pap IG, HPV-hr    Well woman exam  -     Pap IG, HPV-hr    Screening for HPV (human papillomavirus)  -     Pap IG, HPV-hr    Other orders  -     tiotropium bromide monohydrate (Spiriva Respimat) 2.5 MCG/ACT aerosol solution inhaler; INHALE 2 INHALATION INTO THE LUNGS DAILY.  -     furosemide (LASIX) 40 MG tablet; TAKE 1 TABLET BY MOUTH EVERY DAY  -     clonazePAM (KlonoPIN) 1 MG tablet; Take 1 mg by mouth.  -     ramipril (ALTACE) 2.5 MG capsule; Take 2.5 mg by mouth Daily.  -      carvedilol (COREG) 6.25 MG tablet; Take 6.25 mg by mouth.  -     albuterol sulfate HFA (ProAir HFA) 108 (90 Base) MCG/ACT inhaler; Inhale 2 puffs Every 4 (Four) Hours.  -     [employer] DIGESTIVE PROBIOTIC 250 MG capsule; Take 250 mg by mouth 2 (Two) Times a Day.  -     Magnesium 500 MG capsule; Take  by mouth.  -     budesonide-formoterol (Symbicort) 160-4.5 MCG/ACT inhaler; INHALE 2 PUFFS TWICE DAILY  -     apixaban (ELIQUIS) 5 MG tablet tablet; Take 5 mg by mouth.  -     amoxicillin (AMOXIL) 500 MG capsule; TAKE FOUR CAPSULES BY MOUTH ONE HOUR PRIOR TO APPOINTMENT AS DIRECTED  -     Dextromethorphan-guaiFENesin 5-100 MG/5ML liquid; Take  by mouth.  -     DEXA Bone Density Axial; Future        Staci Cabrera DO  9/1/2020  10:47

## 2020-09-03 LAB
BACTERIA UR CULT: NO GROWTH
BACTERIA UR CULT: NORMAL

## 2020-09-04 ENCOUNTER — HOSPITAL ENCOUNTER (OUTPATIENT)
Dept: MAMMOGRAPHY | Facility: HOSPITAL | Age: 60
Discharge: HOME | End: 2020-09-04
Admitting: OBSTETRICS & GYNECOLOGY

## 2020-09-04 DIAGNOSIS — Z12.31 VISIT FOR SCREENING MAMMOGRAM: ICD-10-CM

## 2020-09-04 LAB
CYTOLOGIST CVX/VAG CYTO: NORMAL
CYTOLOGY CVX/VAG DOC CYTO: NORMAL
CYTOLOGY CVX/VAG DOC THIN PREP: NORMAL
DX ICD CODE: NORMAL
HIV 1 & 2 AB SER-IMP: NORMAL
HPV I/H RISK 1 DNA CVX QL PROBE+SIG AMP: NEGATIVE
OTHER STN SPEC: NORMAL
STAT OF ADQ CVX/VAG CYTO-IMP: NORMAL

## 2020-09-04 PROCEDURE — 77067 SCR MAMMO BI INCL CAD: CPT

## 2020-09-04 PROCEDURE — 77063 BREAST TOMOSYNTHESIS BI: CPT

## 2020-09-09 DIAGNOSIS — R92.8 ABNORMAL MAMMOGRAM: Primary | ICD-10-CM

## 2021-11-27 ENCOUNTER — APPOINTMENT (OUTPATIENT)
Dept: VACCINE CLINIC | Facility: HOSPITAL | Age: 61
End: 2021-11-27

## 2022-04-18 ENCOUNTER — TRANSCRIBE ORDERS (OUTPATIENT)
Dept: HOME HEALTH SERVICES | Facility: HOME HEALTHCARE | Age: 62
End: 2022-04-18

## 2022-04-18 ENCOUNTER — HOME HEALTH ADMISSION (OUTPATIENT)
Dept: HOME HEALTH SERVICES | Facility: HOME HEALTHCARE | Age: 62
End: 2022-04-18

## 2022-04-18 DIAGNOSIS — L97.812 ULCER OF RIGHT PRETIBIAL REGION, WITH FAT LAYER EXPOSED: Primary | ICD-10-CM

## 2023-07-27 ENCOUNTER — TELEPHONE (OUTPATIENT)
Dept: FAMILY MEDICINE CLINIC | Facility: CLINIC | Age: 63
End: 2023-07-27
Payer: COMMERCIAL

## 2023-07-27 NOTE — TELEPHONE ENCOUNTER
Patient's family was seen today.     informed me that Griselda Dunn passed away last year in 2002  at [facility] due to COPD possible COVID.  Need to update her chart as to .        Michael Cai MD